# Patient Record
Sex: MALE | Race: BLACK OR AFRICAN AMERICAN | NOT HISPANIC OR LATINO | URBAN - NONMETROPOLITAN AREA
[De-identification: names, ages, dates, MRNs, and addresses within clinical notes are randomized per-mention and may not be internally consistent; named-entity substitution may affect disease eponyms.]

---

## 2019-04-25 ENCOUNTER — IMPORTED ENCOUNTER (OUTPATIENT)
Dept: URBAN - NONMETROPOLITAN AREA CLINIC 1 | Facility: CLINIC | Age: 24
End: 2019-04-25

## 2019-04-25 PROCEDURE — 92015 DETERMINE REFRACTIVE STATE: CPT

## 2019-04-25 PROCEDURE — V2521 CNTCT LENS HYDROPHILIC TORIC: HCPCS

## 2019-04-25 PROCEDURE — 92014 COMPRE OPH EXAM EST PT 1/>: CPT

## 2019-04-25 NOTE — PATIENT DISCUSSION
Myopia-Discussed diagnosis with patient. -Explained that people who are myopic are at a higher risk for developing RD/RT and reviewed associated S&S.-Pt to contact our office if symptoms develop. Updated spec Rx given. Continue current CL Rx. Rx given. Updated CL Rx given.; 's Notes: NO PCP AT THIS TIME

## 2019-11-08 NOTE — PATIENT DISCUSSION
Pt edu vortex keratopathy, manifests as a whorl-like pattern of alexander brown or gray deposits on the cornea in a clockwise fashion. Amiodarone is the most common cause of corneal verticillata. Pt states he has been on Amiodarone for 10-15 years due to decreased Ejection fraction of <35%. Pt has a f/u with his cardiologist for an echo next week. Advised pt to keep f/u with cardiologist and Dr. Louis Yanez will speak with his cardiologist regarding Dx and use of Amiodarone. If Amiodarone is still needed can then possibly consider Heparin Drops. Advised pt to call with any vision changes.

## 2019-11-19 NOTE — PATIENT DISCUSSION
Pt here to discuss the use of Heparin Gtts to help with Vortex Keratopathy. Discussed in detail with patient the off label use of Heparin gtts vs Debridement. Debridement not recommended at this time since Vortex Keratopathy can return with continued use of Amiodarone. Discussed that the Heparin gtts have to be specifically made as a compounded drug and highly likely that insurance will not cover the cost.  If gtts are not effective can then consider debridement. Pt edu as the Heparin gtts build up in the system it will continuously wash out the effects of the amiodarone. Pt is scheduled for Echo and f/u with Dr. Madina Roth in a few weeks and will then find out if the Amiodarone needs to be adjusted. Pt elects to proceed with the use of the gtts.

## 2019-12-20 NOTE — PATIENT DISCUSSION
Pt edu is slightly improving. Pt states that he did have his echo and f/u with Dr. Chase Rai and EF went from 17% to 70%. Pt did discussed with Dr. Chase Rai the possibility of stopping Amiodarone but pt is to have a consult with Dr. Chidi Benavidez for possible Ablation procedure. If pt does stop Amiodarone and Ablation procedure successful can refer back to Dr. Johanna Montiel for Debridement. Right now recommend continuing with Heparin 1gtt TID OU. Will follow up once patient see's Dr. Natanael Mooney.

## 2020-07-31 NOTE — PATIENT DISCUSSION
Pt edu VA is improving and Vortex Keratopathy improving. Ablation was performed and pt currently off Amiodarone. Will continue to monitor. Recommend pt follow back up with [facility] in regards to Vortex Keratopathy. Pt did not notice any vision improvement with Topical Heparin therapy so medication was stopped.

## 2020-09-28 NOTE — PATIENT DISCUSSION
Will start with fairly aggressive treatment now that patient is off medication. Will do Plugs as well as OTC tears 4-6 times a day, refresh PM to use at night an thera tears nutrition. Will have him follow up with [facility] in about 6 weeks after plugs have been inserted to monitor progress. IF non of that therapy works then we can try to resort to Restasis or [place]. If that doesnt work then we may need to send him to [facility] for a dry eye consult.

## 2020-09-28 NOTE — PATIENT DISCUSSION
Advised patient that it was the Amioderone that was causing the issue and the Keratopathy is almost resolved. Patient advised that his VA has also improved since stopping the drug.

## 2020-10-30 NOTE — PROCEDURE NOTE: CLINICAL
PROCEDURE NOTE: Punctal Plugs, Silicone Anesthesia: Topical. Prep: Antibiotic Drops q 5min x 3. Prior to treatment, the risks/benefits/alternatives were discussed. The patient wished to proceed with procedure. One drop of proparacaine was placed and a drop of lidocaine gel was placed over the puncta. 0.7 OD and 0.8 OS mm permanent silicone plugs were inserted in both lower eyelids. Patient tolerated procedure well. There were no complications. Post procedure instructions given. Queen Nathan

## 2022-04-10 ASSESSMENT — VISUAL ACUITY
OS_SC: 20/20
OD_SC: 20/20 BLURRY
OS_CC: J1+
OD_CC: J1+

## 2022-04-10 ASSESSMENT — TONOMETRY
OD_IOP_MMHG: 15
OS_IOP_MMHG: 15

## 2022-05-06 NOTE — PROCEDURE NOTE: CLINICAL
PROCEDURE NOTE: Punctal Plugs, Quintess Dissolvable ([id number], ) OS. Diagnosis: Dry Eye Syndrome. Prior to treatment, the risks/benefits/alternatives were discussed. The patient wished to proceed with procedure. Temporary collagen plugs were inserted. Patient tolerated procedure well. There were no complications. Post procedure instructions given. Shin Polanco